# Patient Record
Sex: MALE | HISPANIC OR LATINO | URBAN - METROPOLITAN AREA
[De-identification: names, ages, dates, MRNs, and addresses within clinical notes are randomized per-mention and may not be internally consistent; named-entity substitution may affect disease eponyms.]

---

## 2024-08-08 ENCOUNTER — OFFICE VISIT (OUTPATIENT)
Dept: URBAN - METROPOLITAN AREA CLINIC 105 | Facility: CLINIC | Age: 49
End: 2024-08-08

## 2024-08-08 NOTE — HPI-TODAY'S VISIT:
48-year-old male with PMH of CVA d/t carotid artery dissection, ? seizure, HTN, HLD, and dysphagia, referred by Dr. Yash Ricci for evaluation of liver lesion. A copy of this note will be sent to referring provider. MRI abd w/ and w/o contrast 1/31/24: 4.2 x 2.8 cm lesion in left hepatic lobe (indeterminate but favored to be benign - FNH or atypical hemangioma), 1.5 x 0.9 cm lesion in right hepatic lobe (? atypical FNH), cardiomegaly, LLL consolidation; note study limited by motion artifact. No significant fat or iron deposition in liver. - recommend repeat MRI in 3-6 months with Eovist contrast, check hep fun panel  Misty

## 2024-08-22 ENCOUNTER — DASHBOARD ENCOUNTERS (OUTPATIENT)
Age: 49
End: 2024-08-22

## 2024-08-22 ENCOUNTER — OFFICE VISIT (OUTPATIENT)
Dept: URBAN - METROPOLITAN AREA CLINIC 105 | Facility: CLINIC | Age: 49
End: 2024-08-22
Payer: COMMERCIAL

## 2024-08-22 VITALS
BODY MASS INDEX: 26.16 KG/M2 | DIASTOLIC BLOOD PRESSURE: 88 MMHG | WEIGHT: 157 LBS | HEIGHT: 65 IN | TEMPERATURE: 97.7 F | HEART RATE: 54 BPM | SYSTOLIC BLOOD PRESSURE: 144 MMHG

## 2024-08-22 DIAGNOSIS — K59.09 CHRONIC CONSTIPATION: ICD-10-CM

## 2024-08-22 DIAGNOSIS — K76.89 LIVER LESION: ICD-10-CM

## 2024-08-22 PROBLEM — 300331000: Status: ACTIVE | Noted: 2024-08-22

## 2024-08-22 PROCEDURE — 99204 OFFICE O/P NEW MOD 45 MIN: CPT | Performed by: INTERNAL MEDICINE

## 2024-08-22 PROCEDURE — 99244 OFF/OP CNSLTJ NEW/EST MOD 40: CPT | Performed by: INTERNAL MEDICINE

## 2024-08-22 RX ORDER — CLONIDINE HYDROCHLORIDE 0.2 MG/1
TABLET ORAL
Qty: 60 TABLET | Status: ACTIVE | COMMUNITY

## 2024-08-22 RX ORDER — CARVEDILOL 12.5 MG/1
TABLET, FILM COATED ORAL
Qty: 180 TABLET | Status: ACTIVE | COMMUNITY

## 2024-08-22 RX ORDER — LACOSAMIDE 150 MG/1
TABLET, FILM COATED ORAL
Qty: 60 TABLET | Status: ACTIVE | COMMUNITY

## 2024-08-22 RX ORDER — PANTOPRAZOLE SODIUM 40 MG/1
TABLET, DELAYED RELEASE ORAL
Qty: 90 TABLET | Status: ACTIVE | COMMUNITY

## 2024-08-22 RX ORDER — ATORVASTATIN CALCIUM 40 MG/1
TABLET, FILM COATED ORAL
Qty: 90 TABLET | Status: ACTIVE | COMMUNITY

## 2024-08-22 RX ORDER — ASPIRIN 81 MG/1
TABLET, COATED ORAL
Qty: 90 TABLET | Status: ACTIVE | COMMUNITY

## 2024-08-22 NOTE — HPI-ZZZTODAY'S VISIT
48-year-old male with PMH of CVA, anemia, bowel obstruction, dysphagia, HLD, aortic dissection repair, and seizure, referred by Yash Washington for evaluation of liver lesion.  A copy of this note will be sent to referring provider. Liver mass was noted on CTA of chest in 1/2024. MRI abdomen with and without contrast 1/31/2024: Heart size enlarged, left lower lobe consolidation, 4.2 cm left hepatic lobe lesion (favored to represent benign lesion such as FNH or atypical hemangioma), 1.5 cm right hepatic lobe lesion (obscured by motion artifact, also possible atypical FNH).  Recommend repeat MRI in 3 to 6 months with Eovist contrast.   services attempted to be used today, but they could not hear us. Kaylyn Cavanaugh ( in office) provided interpretation. Today, pt states he sometimes have pain on the R side. Feels like gurgling or gas pain. He admits he doesn't have regular BMs. Has BMs every other day. Almost always has to use suppositories to have BMs. Sometimes BMs are very watery or muddy, sometimes solid. Wife has noticed mustard or greenish color to stools. Sometimes stools are flat instead of cylindrical. No rectal bleeding or melena. States he had problem with hemorrhoids about 5 years ago and they did some evaluation, but not sure what it was. Has not had colonoscopy before. No history of colon cancer screening.  Denies jaundice, N/V, heartburn. He was in ICU for 1 month last year, had feeding tube at that time. Had stroke during aortic aneurysm repair surgery. The feeding tube was because of the stroke. Thinks that when they took it out, they caused damage there. Has pain in that area, to the L of umbilicus.

## 2024-08-23 ENCOUNTER — TELEPHONE ENCOUNTER (OUTPATIENT)
Dept: URBAN - METROPOLITAN AREA CLINIC 105 | Facility: CLINIC | Age: 49
End: 2024-08-23

## 2024-08-23 LAB
ABSOLUTE BASOPHILS: 32
ABSOLUTE EOSINOPHILS: 108
ABSOLUTE LYMPHOCYTES: 1112
ABSOLUTE MONOCYTES: 400
ABSOLUTE NEUTROPHILS: 3748
ALBUMIN/GLOBULIN RATIO: 2
ALBUMIN: 4.3
ALKALINE PHOSPHATASE: 101
ALT (SGPT): 71
AST (SGOT): 36
BASOPHILS: 0.6
BILIRUBIN, DIRECT: 0.2
BILIRUBIN, INDIRECT: 0.7
BILIRUBIN, TOTAL: 0.9
BUN/CREATININE RATIO: (no result)
CALCIUM: 9.6
CARBON DIOXIDE: 28
CHLORIDE: 103
CREATININE: 0.9
EGFR: 105
EOSINOPHILS: 2
GLOBULIN: 2.1
GLUCOSE: 88
HEMATOCRIT: 41.9
HEMOGLOBIN: 14.7
LYMPHOCYTES: 20.6
MCH: 33.6
MCHC: 35.1
MCV: 95.7
MONOCYTES: 7.4
MPV: 11.8
NEUTROPHILS: 69.4
PLATELET COUNT: 149
POTASSIUM: 4.4
PROTEIN, TOTAL: 6.4
RDW: 13.2
RED BLOOD CELL COUNT: 4.38
SODIUM: 139
TSH W/REFLEX TO FT4: 1.5
UREA NITROGEN (BUN): 14
WHITE BLOOD CELL COUNT: 5.4

## 2024-08-29 LAB
ALBUMIN/GLOBULIN RATIO: 1.8
ALBUMIN: 4.3
ALKALINE PHOSPHATASE: 104
ALT (SGPT): 63
AST (SGOT): 33
BILIRUBIN, DIRECT: 0.3
BILIRUBIN, INDIRECT: 0.7
BILIRUBIN, TOTAL: 1
GLOBULIN: 2.4
HBSAG SCREEN: (no result)
HEP A AB, IGM: (no result)
HEP B CORE AB, IGM: (no result)
HEPATITIS C ANTIBODY: (no result)
INR: 1.1
PROTEIN, TOTAL: 6.7
PT: 11.3

## 2024-11-01 ENCOUNTER — OFFICE VISIT (OUTPATIENT)
Dept: URBAN - METROPOLITAN AREA CLINIC 105 | Facility: CLINIC | Age: 49
End: 2024-11-01

## 2024-11-01 RX ORDER — CLONIDINE HYDROCHLORIDE 0.2 MG/1
TABLET ORAL
Qty: 60 TABLET | COMMUNITY

## 2024-11-01 RX ORDER — ATORVASTATIN CALCIUM 40 MG/1
TABLET, FILM COATED ORAL
Qty: 90 TABLET | COMMUNITY

## 2024-11-01 RX ORDER — LACOSAMIDE 150 MG/1
TABLET, FILM COATED ORAL
Qty: 60 TABLET | COMMUNITY

## 2024-11-01 RX ORDER — CARVEDILOL 12.5 MG/1
TABLET, FILM COATED ORAL
Qty: 180 TABLET | COMMUNITY

## 2024-11-01 RX ORDER — ASPIRIN 81 MG/1
TABLET, COATED ORAL
Qty: 90 TABLET | COMMUNITY

## 2024-11-01 RX ORDER — PANTOPRAZOLE SODIUM 40 MG/1
TABLET, DELAYED RELEASE ORAL
Qty: 90 TABLET | COMMUNITY

## 2024-11-01 NOTE — HPI-ZZZTODAY'S VISIT
08/22/2024 48-year-old male with PMH of CVA, anemia, bowel obstruction, dysphagia, HLD, aortic dissection repair, and seizure, referred by Yash Washington for evaluation of liver lesion.  A copy of this note will be sent to referring provider. Liver mass was noted on CTA of chest in 1/2024. MRI abdomen with and without contrast 1/31/2024: Heart size enlarged, left lower lobe consolidation, 4.2 cm left hepatic lobe lesion (favored to represent benign lesion such as FNH or atypical hemangioma), 1.5 cm right hepatic lobe lesion (obscured by motion artifact, also possible atypical FNH).  Recommend repeat MRI in 3 to 6 months with Eovist contrast.   services attempted to be used today, but they could not hear us. Jennie Cavanaugh ( in office) provided interpretation. Today, pt states he sometimes have pain on the R side. Feels like gurgling or gas pain. He admits he doesn't have regular BMs. Has BMs every other day. Almost always has to use suppositories to have BMs. Sometimes BMs are very watery or muddy, sometimes solid. Wife has noticed mustard or greenish color to stools. Sometimes stools are flat instead of cylindrical. No rectal bleeding or melena. States he had problem with hemorrhoids about 5 years ago and they did some evaluation, but not sure what it was. Has not had colonoscopy before. No history of colon cancer screening.  Denies jaundice, N/V, heartburn. He was in ICU for 1 month last year, had feeding tube at that time. Had stroke during aortic aneurysm repair surgery. The feeding tube was because of the stroke. Thinks that when they took it out, they caused damage there. Has pain in that area, to the L of umbilicus.  11/01/2024 Pt presents for f/u. At last visit, labs ordered. He was recommended fiber supplement and MiraLax daily for constipation, to monitor for improvement in abdominal pains with this. MRI with Eovist contrast ordered and showed 3.1 cm hepatic lesion favoring atypical hemangioma, stable 1.1 cm hemangioma, no significant hepatic iron or lipid deposition; mildly enlarged heart, s/p median sternotomy. Recommending MRI in 6 to 12 months to document longer-term stability. Pt reported he does not drink alcohol or take Tylenol.   Labs 8/28/2024: ALT 63, direct bili 0.3, hepatic function panel otherwise normal.  PT/INR normal.  Hepatitis panel negative. 8/22/2024: ALT 71, hepatic function panel otherwise normal.  BMP, CBC, TSH normal.  Misty